# Patient Record
Sex: MALE | Race: WHITE | NOT HISPANIC OR LATINO | URBAN - METROPOLITAN AREA
[De-identification: names, ages, dates, MRNs, and addresses within clinical notes are randomized per-mention and may not be internally consistent; named-entity substitution may affect disease eponyms.]

---

## 2017-01-11 ENCOUNTER — OUTPATIENT (OUTPATIENT)
Dept: OUTPATIENT SERVICES | Facility: HOSPITAL | Age: 57
LOS: 1 days | Discharge: HOME | End: 2017-01-11

## 2017-06-27 DIAGNOSIS — F41.0 PANIC DISORDER [EPISODIC PAROXYSMAL ANXIETY]: ICD-10-CM

## 2017-06-27 DIAGNOSIS — R94.5 ABNORMAL RESULTS OF LIVER FUNCTION STUDIES: ICD-10-CM

## 2017-06-27 DIAGNOSIS — R73.09 OTHER ABNORMAL GLUCOSE: ICD-10-CM

## 2017-06-27 DIAGNOSIS — H53.8 OTHER VISUAL DISTURBANCES: ICD-10-CM

## 2017-06-27 DIAGNOSIS — K21.9 GASTRO-ESOPHAGEAL REFLUX DISEASE WITHOUT ESOPHAGITIS: ICD-10-CM

## 2017-06-27 DIAGNOSIS — E78.00 PURE HYPERCHOLESTEROLEMIA, UNSPECIFIED: ICD-10-CM

## 2017-06-27 DIAGNOSIS — F17.200 NICOTINE DEPENDENCE, UNSPECIFIED, UNCOMPLICATED: ICD-10-CM

## 2017-06-27 DIAGNOSIS — Z00.00 ENCOUNTER FOR GENERAL ADULT MEDICAL EXAMINATION WITHOUT ABNORMAL FINDINGS: ICD-10-CM

## 2017-09-20 ENCOUNTER — OUTPATIENT (OUTPATIENT)
Dept: OUTPATIENT SERVICES | Facility: HOSPITAL | Age: 57
LOS: 1 days | Discharge: HOME | End: 2017-09-20

## 2017-09-20 DIAGNOSIS — R94.5 ABNORMAL RESULTS OF LIVER FUNCTION STUDIES: ICD-10-CM

## 2017-09-20 DIAGNOSIS — H53.8 OTHER VISUAL DISTURBANCES: ICD-10-CM

## 2017-09-20 DIAGNOSIS — K21.9 GASTRO-ESOPHAGEAL REFLUX DISEASE WITHOUT ESOPHAGITIS: ICD-10-CM

## 2017-09-20 DIAGNOSIS — E78.00 PURE HYPERCHOLESTEROLEMIA, UNSPECIFIED: ICD-10-CM

## 2017-09-20 DIAGNOSIS — F41.0 PANIC DISORDER [EPISODIC PAROXYSMAL ANXIETY]: ICD-10-CM

## 2017-09-20 DIAGNOSIS — R73.09 OTHER ABNORMAL GLUCOSE: ICD-10-CM

## 2017-09-20 DIAGNOSIS — J30.2 OTHER SEASONAL ALLERGIC RHINITIS: ICD-10-CM

## 2017-09-20 DIAGNOSIS — F17.200 NICOTINE DEPENDENCE, UNSPECIFIED, UNCOMPLICATED: ICD-10-CM

## 2017-09-20 DIAGNOSIS — M54.9 DORSALGIA, UNSPECIFIED: ICD-10-CM

## 2017-12-04 ENCOUNTER — OUTPATIENT (OUTPATIENT)
Dept: OUTPATIENT SERVICES | Facility: HOSPITAL | Age: 57
LOS: 1 days | Discharge: HOME | End: 2017-12-04

## 2017-12-04 DIAGNOSIS — E78.01 FAMILIAL HYPERCHOLESTEROLEMIA: ICD-10-CM

## 2018-01-26 ENCOUNTER — TRANSCRIPTION ENCOUNTER (OUTPATIENT)
Age: 58
End: 2018-01-26

## 2018-05-24 ENCOUNTER — OUTPATIENT (OUTPATIENT)
Dept: OUTPATIENT SERVICES | Facility: HOSPITAL | Age: 58
LOS: 1 days | Discharge: HOME | End: 2018-05-24

## 2018-05-24 DIAGNOSIS — F41.0 PANIC DISORDER [EPISODIC PAROXYSMAL ANXIETY]: ICD-10-CM

## 2018-05-24 DIAGNOSIS — F17.200 NICOTINE DEPENDENCE, UNSPECIFIED, UNCOMPLICATED: ICD-10-CM

## 2018-05-24 DIAGNOSIS — R94.5 ABNORMAL RESULTS OF LIVER FUNCTION STUDIES: ICD-10-CM

## 2018-05-24 DIAGNOSIS — H53.8 OTHER VISUAL DISTURBANCES: ICD-10-CM

## 2018-05-24 DIAGNOSIS — R73.09 OTHER ABNORMAL GLUCOSE: ICD-10-CM

## 2018-05-24 DIAGNOSIS — J30.2 OTHER SEASONAL ALLERGIC RHINITIS: ICD-10-CM

## 2018-05-24 DIAGNOSIS — E78.00 PURE HYPERCHOLESTEROLEMIA, UNSPECIFIED: ICD-10-CM

## 2018-05-24 DIAGNOSIS — M54.9 DORSALGIA, UNSPECIFIED: ICD-10-CM

## 2018-05-24 DIAGNOSIS — K21.9 GASTRO-ESOPHAGEAL REFLUX DISEASE WITHOUT ESOPHAGITIS: ICD-10-CM

## 2018-08-01 ENCOUNTER — OUTPATIENT (OUTPATIENT)
Dept: OUTPATIENT SERVICES | Facility: HOSPITAL | Age: 58
LOS: 1 days | Discharge: HOME | End: 2018-08-01

## 2018-08-01 DIAGNOSIS — B58.81 TOXOPLASMA MYOCARDITIS: ICD-10-CM

## 2018-08-01 DIAGNOSIS — I25.10 ATHEROSCLEROTIC HEART DISEASE OF NATIVE CORONARY ARTERY WITHOUT ANGINA PECTORIS: ICD-10-CM

## 2018-08-01 DIAGNOSIS — R78.89 FINDING OF OTHER SPECIFIED SUBSTANCES, NOT NORMALLY FOUND IN BLOOD: ICD-10-CM

## 2018-11-02 ENCOUNTER — OUTPATIENT (OUTPATIENT)
Dept: OUTPATIENT SERVICES | Facility: HOSPITAL | Age: 58
LOS: 1 days | Discharge: HOME | End: 2018-11-02

## 2018-11-02 DIAGNOSIS — R91.8 OTHER NONSPECIFIC ABNORMAL FINDING OF LUNG FIELD: ICD-10-CM

## 2018-11-07 ENCOUNTER — OUTPATIENT (OUTPATIENT)
Dept: OUTPATIENT SERVICES | Facility: HOSPITAL | Age: 58
LOS: 1 days | Discharge: HOME | End: 2018-11-07

## 2018-11-07 DIAGNOSIS — R94.5 ABNORMAL RESULTS OF LIVER FUNCTION STUDIES: ICD-10-CM

## 2018-11-07 DIAGNOSIS — J30.2 OTHER SEASONAL ALLERGIC RHINITIS: ICD-10-CM

## 2018-11-07 DIAGNOSIS — R91.8 OTHER NONSPECIFIC ABNORMAL FINDING OF LUNG FIELD: ICD-10-CM

## 2018-11-07 DIAGNOSIS — M54.9 DORSALGIA, UNSPECIFIED: ICD-10-CM

## 2018-11-07 DIAGNOSIS — H53.8 OTHER VISUAL DISTURBANCES: ICD-10-CM

## 2018-11-07 DIAGNOSIS — E78.00 PURE HYPERCHOLESTEROLEMIA, UNSPECIFIED: ICD-10-CM

## 2018-11-07 DIAGNOSIS — F41.0 PANIC DISORDER [EPISODIC PAROXYSMAL ANXIETY]: ICD-10-CM

## 2018-11-07 DIAGNOSIS — F17.200 NICOTINE DEPENDENCE, UNSPECIFIED, UNCOMPLICATED: ICD-10-CM

## 2018-11-07 DIAGNOSIS — K21.9 GASTRO-ESOPHAGEAL REFLUX DISEASE WITHOUT ESOPHAGITIS: ICD-10-CM

## 2018-11-07 DIAGNOSIS — R73.09 OTHER ABNORMAL GLUCOSE: ICD-10-CM

## 2018-11-27 ENCOUNTER — TRANSCRIPTION ENCOUNTER (OUTPATIENT)
Age: 58
End: 2018-11-27

## 2018-12-05 ENCOUNTER — TRANSCRIPTION ENCOUNTER (OUTPATIENT)
Age: 58
End: 2018-12-05

## 2019-01-29 ENCOUNTER — TRANSCRIPTION ENCOUNTER (OUTPATIENT)
Age: 59
End: 2019-01-29

## 2019-11-01 ENCOUNTER — OUTPATIENT (OUTPATIENT)
Dept: OUTPATIENT SERVICES | Facility: HOSPITAL | Age: 59
LOS: 1 days | Discharge: HOME | End: 2019-11-01

## 2019-11-01 DIAGNOSIS — E78.00 PURE HYPERCHOLESTEROLEMIA, UNSPECIFIED: ICD-10-CM

## 2019-11-01 DIAGNOSIS — J30.2 OTHER SEASONAL ALLERGIC RHINITIS: ICD-10-CM

## 2019-11-01 DIAGNOSIS — Z00.00 ENCOUNTER FOR GENERAL ADULT MEDICAL EXAMINATION WITHOUT ABNORMAL FINDINGS: ICD-10-CM

## 2019-11-01 DIAGNOSIS — M54.9 DORSALGIA, UNSPECIFIED: ICD-10-CM

## 2019-11-01 DIAGNOSIS — F17.200 NICOTINE DEPENDENCE, UNSPECIFIED, UNCOMPLICATED: ICD-10-CM

## 2019-11-01 DIAGNOSIS — F41.0 PANIC DISORDER [EPISODIC PAROXYSMAL ANXIETY]: ICD-10-CM

## 2019-11-01 DIAGNOSIS — H53.8 OTHER VISUAL DISTURBANCES: ICD-10-CM

## 2019-11-01 DIAGNOSIS — R91.8 OTHER NONSPECIFIC ABNORMAL FINDING OF LUNG FIELD: ICD-10-CM

## 2019-11-01 DIAGNOSIS — R73.09 OTHER ABNORMAL GLUCOSE: ICD-10-CM

## 2019-11-01 DIAGNOSIS — K21.9 GASTRO-ESOPHAGEAL REFLUX DISEASE WITHOUT ESOPHAGITIS: ICD-10-CM

## 2019-11-01 DIAGNOSIS — R94.5 ABNORMAL RESULTS OF LIVER FUNCTION STUDIES: ICD-10-CM

## 2020-01-18 ENCOUNTER — OUTPATIENT (OUTPATIENT)
Dept: OUTPATIENT SERVICES | Facility: HOSPITAL | Age: 60
LOS: 1 days | Discharge: HOME | End: 2020-01-18
Payer: COMMERCIAL

## 2020-01-18 DIAGNOSIS — R91.8 OTHER NONSPECIFIC ABNORMAL FINDING OF LUNG FIELD: ICD-10-CM

## 2020-01-18 PROCEDURE — 71250 CT THORAX DX C-: CPT | Mod: 26

## 2021-05-27 ENCOUNTER — TRANSCRIPTION ENCOUNTER (OUTPATIENT)
Age: 61
End: 2021-05-27

## 2021-09-03 ENCOUNTER — TRANSCRIPTION ENCOUNTER (OUTPATIENT)
Age: 61
End: 2021-09-03

## 2021-12-06 PROBLEM — Z00.00 ENCOUNTER FOR PREVENTIVE HEALTH EXAMINATION: Status: ACTIVE | Noted: 2021-12-06

## 2023-03-08 ENCOUNTER — APPOINTMENT (OUTPATIENT)
Dept: PULMONOLOGY | Facility: CLINIC | Age: 63
End: 2023-03-08
Payer: COMMERCIAL

## 2023-03-08 ENCOUNTER — NON-APPOINTMENT (OUTPATIENT)
Age: 63
End: 2023-03-08

## 2023-03-08 VITALS
OXYGEN SATURATION: 98 % | DIASTOLIC BLOOD PRESSURE: 70 MMHG | HEART RATE: 72 BPM | RESPIRATION RATE: 14 BRPM | BODY MASS INDEX: 23.16 KG/M2 | HEIGHT: 72 IN | SYSTOLIC BLOOD PRESSURE: 118 MMHG | WEIGHT: 171 LBS

## 2023-03-08 DIAGNOSIS — K21.9 GASTRO-ESOPHAGEAL REFLUX DISEASE W/OUT ESOPHAGITIS: ICD-10-CM

## 2023-03-08 DIAGNOSIS — J44.9 CHRONIC OBSTRUCTIVE PULMONARY DISEASE, UNSPECIFIED: ICD-10-CM

## 2023-03-08 DIAGNOSIS — Z86.39 PERSONAL HISTORY OF OTHER ENDOCRINE, NUTRITIONAL AND METABOLIC DISEASE: ICD-10-CM

## 2023-03-08 DIAGNOSIS — Z87.891 PERSONAL HISTORY OF NICOTINE DEPENDENCE: ICD-10-CM

## 2023-03-08 DIAGNOSIS — R91.8 OTHER NONSPECIFIC ABNORMAL FINDING OF LUNG FIELD: ICD-10-CM

## 2023-03-08 PROCEDURE — G0296 VISIT TO DETERM LDCT ELIG: CPT

## 2023-03-08 PROCEDURE — 99203 OFFICE O/P NEW LOW 30 MIN: CPT | Mod: 25

## 2023-03-08 NOTE — HISTORY OF PRESENT ILLNESS
[TextBox_4] : 62 years old presented for above last seen in January 2020 at that time he was active smoker and he had lung nodule.  He said he stopped smoking few months after so almost 3 years ago he denies any shortness of breath any wheezing he does not take any inhaler last CAT scan he had an 2021 and the PFT 3 years ago was normal.  He denies any recurrent bronchitis no history of COPD.

## 2023-03-08 NOTE — REASON FOR VISIT
[Initial] : an initial visit [Abnormal CXR/ Chest CT] : an abnormal CXR/ chest CT [COPD] : COPD [Pulmonary Nodules] : pulmonary nodules

## 2023-03-08 NOTE — DISCUSSION/SUMMARY
[FreeTextEntry1] : Lung nodule high risk ex-smoker more than 30 pack a year\par Chest CT screening\par Need to rule out COPD\par PFTs\par To call me after CAT scan\par

## 2023-03-26 ENCOUNTER — RESULT REVIEW (OUTPATIENT)
Age: 63
End: 2023-03-26

## 2023-03-26 ENCOUNTER — OUTPATIENT (OUTPATIENT)
Dept: OUTPATIENT SERVICES | Facility: HOSPITAL | Age: 63
LOS: 1 days | End: 2023-03-26
Payer: COMMERCIAL

## 2023-03-26 DIAGNOSIS — R91.1 SOLITARY PULMONARY NODULE: ICD-10-CM

## 2023-03-26 DIAGNOSIS — Z00.8 ENCOUNTER FOR OTHER GENERAL EXAMINATION: ICD-10-CM

## 2023-03-26 PROCEDURE — 71271 CT THORAX LUNG CANCER SCR C-: CPT

## 2023-03-26 PROCEDURE — 71271 CT THORAX LUNG CANCER SCR C-: CPT | Mod: 26

## 2023-03-27 DIAGNOSIS — R91.1 SOLITARY PULMONARY NODULE: ICD-10-CM

## 2023-03-30 ENCOUNTER — TRANSCRIPTION ENCOUNTER (OUTPATIENT)
Age: 63
End: 2023-03-30

## 2023-10-06 ENCOUNTER — NON-APPOINTMENT (OUTPATIENT)
Age: 63
End: 2023-10-06

## 2023-10-09 ENCOUNTER — APPOINTMENT (OUTPATIENT)
Dept: PLASTIC SURGERY | Facility: CLINIC | Age: 63
End: 2023-10-09
Payer: COMMERCIAL

## 2023-10-09 VITALS — BODY MASS INDEX: 23.3 KG/M2 | HEIGHT: 72 IN | WEIGHT: 172 LBS

## 2023-10-09 DIAGNOSIS — D48.5 NEOPLASM OF UNCERTAIN BEHAVIOR OF SKIN: ICD-10-CM

## 2023-10-09 DIAGNOSIS — Z78.9 OTHER SPECIFIED HEALTH STATUS: ICD-10-CM

## 2023-10-09 PROCEDURE — 99203 OFFICE O/P NEW LOW 30 MIN: CPT | Mod: 25

## 2023-10-09 PROCEDURE — 11103 TANGNTL BX SKIN EA SEP/ADDL: CPT

## 2023-10-09 PROCEDURE — 11102 TANGNTL BX SKIN SINGLE LES: CPT

## 2023-10-09 RX ORDER — PANTOPRAZOLE SODIUM 40 MG/1
40 GRANULE, DELAYED RELEASE ORAL
Refills: 0 | Status: ACTIVE | COMMUNITY

## 2023-10-09 RX ORDER — PAROXETINE HYDROCHLORIDE 40 MG/1
40 TABLET, FILM COATED ORAL
Refills: 0 | Status: ACTIVE | COMMUNITY

## 2023-10-09 RX ORDER — ELECTROLYTES/DEXTROSE
SOLUTION, ORAL ORAL
Refills: 0 | Status: ACTIVE | COMMUNITY

## 2023-10-09 RX ORDER — ASPIRIN 81 MG
81 TABLET, DELAYED RELEASE (ENTERIC COATED) ORAL
Refills: 0 | Status: ACTIVE | COMMUNITY

## 2023-10-09 RX ORDER — ATORVASTATIN CALCIUM 40 MG/1
40 TABLET, FILM COATED ORAL
Refills: 0 | Status: ACTIVE | COMMUNITY

## 2023-10-23 ENCOUNTER — APPOINTMENT (OUTPATIENT)
Dept: PLASTIC SURGERY | Facility: CLINIC | Age: 63
End: 2023-10-23
Payer: COMMERCIAL

## 2023-10-23 DIAGNOSIS — L82.1 OTHER SEBORRHEIC KERATOSIS: ICD-10-CM

## 2023-10-23 PROCEDURE — 99024 POSTOP FOLLOW-UP VISIT: CPT

## 2024-01-22 ENCOUNTER — APPOINTMENT (OUTPATIENT)
Dept: PLASTIC SURGERY | Facility: CLINIC | Age: 64
End: 2024-01-22
Payer: COMMERCIAL

## 2024-01-22 LAB — CORE LAB BIOPSY: NORMAL

## 2024-01-22 PROCEDURE — 11102 TANGNTL BX SKIN SINGLE LES: CPT

## 2024-01-22 PROCEDURE — 11103 TANGNTL BX SKIN EA SEP/ADDL: CPT

## 2024-01-22 NOTE — DATA REVIEWED
[FreeTextEntry1] : Patient:   LOUISE TEMPLE  Accession:                             [id number]  Collected Date/Time:                   10/9/2023 10:45 EDT Received Date/Time:                    10/10/2023 09:51 EDT  Surgical Pathology Report - Auth (Verified)  Specimen(s) Submitted 1  Vertex of scalp skin lesion 2  Right anteroparietal scalp skin lesion 3  Right parietal scalp skin lesion  Final Diagnosis 1.  Vertex of scalp, skin lesion:      -Seborrheic keratosis.   2. Right anteroparietal scalp, skin lesion, shave biopsy:     -Seborrheic keratosis.  3. Right parietal scalp skin lesion:     -Seborrheic keratosis, focally irritated. Verified by: Lynne Opitz, M.D. (Electronic Signature) Reported on: 10/11/23 16:37 EDT, [place] [address] Phone: [phone]   Fax: [phone] _________________________________________________________________   Clinical Information Scalp lesions, shave biopsies  Perioperative Diagnosis D48.5-Neoplasm of uncertain behavior of skin  Gross Description 1.  Received in formalin labeled "vertex of scalp skin lesion".  The  specimen consists of 1 tan skin shave biopsy measuring 1.7 x 1.0 x 0.1  cm.  The specimen is inked, serially sectioned and entirely submitted.  (1 block)  2.  Received in formalin labeled "right anterior parietal scalp skin  lesion".  The specimen consists of 1 tan skin shave biopsy measuring 1.3  x 1.0 x 0.1 cm.  The specimen is inked, serially sectioned and entirely  submitted.  (1 block)  3.  Received in formalin labeled "right parietal scalp skin lesion".  The  specimen consists of 1 tan skin shave biopsy measuring 2.2 x 1.0 x 0.1  cm.  The specimen is inked, serially sectioned and entirely submitted.  (2 blocks)   10/11/2023 05:20:09 EDT lb  Disclaimer In addition to other data that may appear on the specimen containers, all  labels have been inspected to confirm the presence of the patients name  and date of birth.  Specimen was received and underwent gross examination at [facility], [address] [place] [address].  Ordered by: JEANNETTE BASS       Collected/Examined: 09Oct2023 10:45AM       Verification Required       Stage: Final       Performed at: [facility] (Med Director: King Guzmán)       Resulted: 11Oct2023 04:37PM       Last Updated: 11Oct2023 04:38PM       Accession: [id number]       Results Hx:	 There are no additional results to show Details:	 To Be Done:	09Oct2023 Status:	Resulted: Requires Verification For:	Neoplasm of uncertain behavior of scalp (D48.5) Overdue:	09Jan2024 10:42AM To Be Performed:	[facility] Communicated By:	Requested transmission to performing location Priority:	Routine Ordered By:	JEANNETTE BASS Supervised By:	JEANNETTE BASS Managed By:	JEANNETTE BASS Authorization:	Not Required Performing Instructions:	 Patient Instructions:	 Order Instructions:	 Questions:	 Date/Time Collected A: 09Oct2023 Number of Sites (Enter each site description below.) A: 3 Site of Specimen A: Vertex of scalp skin lesion shave biopsy Site of Specimen A: Right anteroparietal scalp skin lesion shave biopsy Site of Specimen A: Right parietal scalp skin lesion shave biopsy Add'l Details:	 Financial Auth:	Not Needed Authorization #:	 Appt. Status:	Appointment Not Needed Effective:	09Oct2023 12:00AM Expires:	09Oct2024 12:00AM Done:	09Oct2023 10:45AM Order #:	[id number] Requisition #:	[id number] Label Type:	 Collection:	Collection Specimen Identifier:	 ID:	 CPT:	 LOINC:	 SNOMED:	 Type:	 Charges:	None Will Be Collected in Office?	No View link item history	 Goals:	None Charging:	          (none) Encounters:	 Creation:	09Oct2023 Appointment JEANNETTE BASS (Plastic Surgery)  Collection:	None Specified Be Done By:	None Specified Scheduled:	None Specified Performed:	None Specified Charge :	None Specified Annotations:	          (none) Electronically Signed By: JEANNETTE BASS MD 09-Oct-2023 10:43 AM

## 2024-01-22 NOTE — ASSESSMENT
[FreeTextEntry1] : 62 y/o male with h/o GERD and HLD who with h/o scalp AK s/p removal of 3x scalp seborrheic keratosis. doing very well (10/9/23) s/p shave biopsy remaining right parietal keratoses  ?left forearm subcutaneous ?ganglion cyst  - Baci/ Aquaphor to all scalp sites - Daily SPF - signs and symptoms of infection reviewed - continue routine derm f/us  -recommend observation left forearm cyst, currently non palpable - All post op instructions reviewed, all questions answered - f/u in 2 weeks with PA path check and scalp surgical site scar mgmt

## 2024-01-22 NOTE — HISTORY OF PRESENT ILLNESS
[FreeTextEntry1] : 62 y/o male with h/o GERD and HLD who presents for evaluation of scalp lesions. Pt states that he has had scalp SKs treated for many years by , however they continue to recur and he is looking for a more permenant option. Denies any h/o skin cancer.   Former smoker, quite 3 years ago (previous >30 years) On ASA prophylactically for high calcium levels  Occ: retired   Interval hx (10/23/23): Pt is now 2 weeks s/p removal of 3x scalp lesions (All Seborrheic kertosis per path). Pt denies fever/chills/drainage. Pt also with c/o right forearm cyst which he has had for years, however it becomes intermittently larger and painful if he accidently bumps it.   Interval hx (1/22/24): here for shave biopsy scalp skin lesions.  Reports left forearm cyst no longer present.  Will forego forearm cyst excision and proceed with scalp skin lesion shave biopsies

## 2024-01-22 NOTE — PHYSICAL EXAM
[de-identified] : Well developed, well nourished in NAD  [de-identified] : Atraumatic, normocephalic, +malepattern baldnessa. R parietal scalp shave sites are CDI and scabbed over without any open areas/active drainage. no cellulitis, erythema or signs of infection. With resudual AK's R adjacent to most posterior shave site, along with additional AKs in R  temporal hairline [de-identified] : EOMIs, +glasses [de-identified] : Non labored on RA [de-identified] : Right anterior forearm with no palpable mass. No overlying skin changes. Currently non tender.

## 2024-01-22 NOTE — PROCEDURE
[Nl] : None [FreeTextEntry1] : parietal scalp AK and right temple (parietal) SK [FreeTextEntry2] : shave biopsy right parietal scalp AK and right temple (parietal) SK [FreeTextEntry6] : Benefits, risks and alternatives of the procedure were discussed. The risks include but not limited to bleeding, infection, poor wound healing and scarring, possible keloid, and need for re-operation. Location of scar and expected post-surgical outcomes were discussed. The patient understands the risks and would like to proceed with the office surgery.  The skin lesion was marked and infiltrated with local anesthesia to effect.  The excision site was prepared and draped in sterile fashion. The skin lesion was excised with the indicated margins in the usual fashion and sent to pathology for review.  Surgical wounds were made hemostatic and repaired as follows:  Site: right parietal scalp Skin lesion width (with margins):  3.5 x 2.5 cm Closure complexity and length: shave biopsy, Monsel's topical, bacitracin, bandage  Site: right temple scalp Skin lesion width (with margins): 1 cm  Closure complexity and length: shave biopsy, Monsel's topical, bacitracin, bandage [FreeTextEntry7] : parietal scalp AK and right temple (parietal) SK

## 2024-01-24 ENCOUNTER — APPOINTMENT (OUTPATIENT)
Dept: ORTHOPEDIC SURGERY | Facility: CLINIC | Age: 64
End: 2024-01-24
Payer: COMMERCIAL

## 2024-01-24 VITALS — HEIGHT: 73 IN | BODY MASS INDEX: 21.87 KG/M2 | WEIGHT: 165 LBS

## 2024-01-24 DIAGNOSIS — M18.12 UNILATERAL PRIMARY OSTEOARTHRITIS OF FIRST CARPOMETACARPAL JOINT, LEFT HAND: ICD-10-CM

## 2024-01-24 PROCEDURE — 73140 X-RAY EXAM OF FINGER(S): CPT | Mod: LT

## 2024-01-24 PROCEDURE — 99214 OFFICE O/P EST MOD 30 MIN: CPT | Mod: 25

## 2024-01-24 PROCEDURE — 20550 NJX 1 TENDON SHEATH/LIGAMENT: CPT

## 2024-01-24 NOTE — DATA REVIEWED
[FreeTextEntry1] :  3 x-ray views taken in the office today of his left thumb show basal joint arthritis without any acute displaced fractures or bony abnormalities.

## 2024-01-24 NOTE — DISCUSSION/SUMMARY
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  We discussed treatment options at length for both of them.  For the arthritis, I recommend a thumb guard for immobilization and support.  He may take over-the-counter anti-inflammatories at his discretion, but he prefers not to take a prescription.  We discussed the option of a cortisone injection in the future, but we will hold off on this for now since the pain has only increased in the last 2 weeks.  Then we discussed treatment options for the trigger finger including observation, cortisone injection, and trigger finger release. The risks and benefits of a cortisone injection were explained to the patient. He understands that sometimes there is a need for second injection.  If the second injection fails, he may want to consider a trigger finger release. The patient would like to proceed with the injection. Under sterile technique and with the patient's consent, I injected 1 cc of dexamethasone and 1 cc of 1% lidocaine into the A1 pulley of the left thumb. He tolerated the procedure well. The puncture site was covered with a band aid. He understands that triggering can reoccur after the cortisone injection.  If it reoccurs, we may consider a 2nd cortisone injection. If it reoccurs a second time, the patient may want to consider a trigger finger release. Follow up in 4-6 weeks with Dr. Vann for repeat evaluation and possibly a second injection and possible surgery consult.

## 2024-01-24 NOTE — PHYSICAL EXAM
[de-identified] : Physical exam of his left wrist and thumb: He has some mild swelling of the digit.  Negative ecchymosis.  Nontender over the distal radius or distal ulna.  Negative anatomical snuffbox tenderness.  Nontender over the radial styloid or first dorsal compartment.  He has pain over the basal joint.  He has pain of the A1 pulley.  There is active locking and clicking of the digit.  Negative Finkelstein.  He can make a fist.  His sensory and motor are intact.

## 2024-01-24 NOTE — HISTORY OF PRESENT ILLNESS
[de-identified] : Patient is a 63-year-old male here for evaluation of his left thumb.  He states that he has been having pain for the last few weeks, however he had experienced a similar pain back in the 90s and was diagnosed with DeQuervein's tenosynovitis.  He had an injection which lasted him a very long time.  He saw Dr. Raza for knee appointment he mentioned his hand.  He recommended that he goes to therapy.  He did 1 session but states the pain felt worse afterwards.

## 2024-01-25 LAB — CORE LAB BIOPSY: NORMAL

## 2024-02-06 ENCOUNTER — APPOINTMENT (OUTPATIENT)
Dept: PLASTIC SURGERY | Facility: CLINIC | Age: 64
End: 2024-02-06

## 2024-02-23 ENCOUNTER — APPOINTMENT (OUTPATIENT)
Dept: ORTHOPEDIC SURGERY | Facility: CLINIC | Age: 64
End: 2024-02-23
Payer: COMMERCIAL

## 2024-02-23 VITALS — HEIGHT: 73 IN | WEIGHT: 165 LBS | BODY MASS INDEX: 21.87 KG/M2

## 2024-02-23 DIAGNOSIS — M65.312 TRIGGER THUMB, LEFT THUMB: ICD-10-CM

## 2024-02-23 PROCEDURE — 99212 OFFICE O/P EST SF 10 MIN: CPT | Mod: 25

## 2024-02-23 PROCEDURE — 20550 NJX 1 TENDON SHEATH/LIGAMENT: CPT | Mod: FA

## 2024-02-23 PROCEDURE — 99202 OFFICE O/P NEW SF 15 MIN: CPT | Mod: 25

## 2024-02-23 NOTE — PHYSICAL EXAM
[de-identified] : Patient is tender to palpation A1 pulley left thumb.  There is triggering present.  There is normal sensation normal cap refill.  No erythema ecchymoses or abrasions.

## 2024-02-23 NOTE — ASSESSMENT
[FreeTextEntry1] : Patient has a left-sided trigger thumb.  Patient will receive his second cortisone injection into the area of the A1 pulley today.  He tolerated it well.  Will see how the injection does.  Hopefully will help to relieve his symptoms.  If it does not the potential for surgical intervention for trigger thumb release was discussed with the patient.

## 2024-02-23 NOTE — PROCEDURE
[FreeTextEntry3] : Trigger finger injection was performed because of pain inflammation and stiffness Anesthesia: ethyl chloride sprayed topically Dexamethasone injection of 1cc 4mg/ml Lidocaine: An injection of Lidocaine 1% 1cc  Patient has tried OTC's including aspirin, Ibuprofen, Aleve etc or prescription NSAIDS, and/or exercises at home and/ or physical therapy without satisfactory response. After verbal consent using sterile preparation and technique. The risks, benefits, and alternatives to cortisone injection were explained in full to the patient. Risks outlined include but are not limited to infection, sepsis, bleeding, scarring, skin discoloration, temporary increase in pain, syncopal episode, failure to resolve symptoms, allergic reaction, symptom recurrence, and elevation of blood sugar in diabetics. Patient understood the risks. All questions were answered. After discussion of options, patient requested an injection. Oral informed consent was obtained and sterile prep was done of the injection site. Sterile technique was utilized for the procedure including the preparation of the solutions used for the injection. Patient tolerated the procedure well. Advised to ice the injection site this evening. Prep with ETOH  locally to site. Sterile technique used.  tendon sheath injected Left thumb flexor tendon sheath

## 2024-02-23 NOTE — HISTORY OF PRESENT ILLNESS
[de-identified] : 63-year-old male comes in with a left-sided trigger thumb.  Patient had injection at his last visit.  It helped but not completely to relieve the triggering of his left thumb.  And he comes in today for evaluation.

## 2025-04-16 ENCOUNTER — APPOINTMENT (OUTPATIENT)
Dept: PULMONOLOGY | Facility: CLINIC | Age: 65
End: 2025-04-16
Payer: COMMERCIAL

## 2025-04-16 VITALS
HEART RATE: 76 BPM | SYSTOLIC BLOOD PRESSURE: 122 MMHG | HEIGHT: 73 IN | BODY MASS INDEX: 22.93 KG/M2 | DIASTOLIC BLOOD PRESSURE: 70 MMHG | RESPIRATION RATE: 15 BRPM | WEIGHT: 173 LBS | OXYGEN SATURATION: 98 %

## 2025-04-16 DIAGNOSIS — J44.9 CHRONIC OBSTRUCTIVE PULMONARY DISEASE, UNSPECIFIED: ICD-10-CM

## 2025-04-16 DIAGNOSIS — R91.8 OTHER NONSPECIFIC ABNORMAL FINDING OF LUNG FIELD: ICD-10-CM

## 2025-04-16 PROCEDURE — 99213 OFFICE O/P EST LOW 20 MIN: CPT | Mod: 25

## 2025-04-16 PROCEDURE — G0296 VISIT TO DETERM LDCT ELIG: CPT

## 2025-04-16 PROCEDURE — G2211 COMPLEX E/M VISIT ADD ON: CPT | Mod: NC

## 2025-04-18 ENCOUNTER — APPOINTMENT (OUTPATIENT)
Dept: CARDIOTHORACIC SURGERY | Facility: CLINIC | Age: 65
End: 2025-04-18
Payer: COMMERCIAL

## 2025-04-18 VITALS — BODY MASS INDEX: 22.93 KG/M2 | HEIGHT: 73 IN | WEIGHT: 173 LBS

## 2025-04-18 DIAGNOSIS — Z87.891 PERSONAL HISTORY OF NICOTINE DEPENDENCE: ICD-10-CM

## 2025-04-18 PROCEDURE — ACP01: CPT | Mod: NC

## 2025-05-30 ENCOUNTER — OUTPATIENT (OUTPATIENT)
Dept: OUTPATIENT SERVICES | Facility: HOSPITAL | Age: 65
LOS: 1 days | End: 2025-05-30
Payer: COMMERCIAL

## 2025-05-30 ENCOUNTER — RESULT REVIEW (OUTPATIENT)
Age: 65
End: 2025-05-30

## 2025-05-30 DIAGNOSIS — J44.9 CHRONIC OBSTRUCTIVE PULMONARY DISEASE, UNSPECIFIED: ICD-10-CM

## 2025-05-30 PROCEDURE — 71250 CT THORAX DX C-: CPT | Mod: 26

## 2025-05-30 PROCEDURE — 71250 CT THORAX DX C-: CPT

## 2025-05-31 DIAGNOSIS — J44.9 CHRONIC OBSTRUCTIVE PULMONARY DISEASE, UNSPECIFIED: ICD-10-CM

## 2025-06-03 ENCOUNTER — NON-APPOINTMENT (OUTPATIENT)
Age: 65
End: 2025-06-03

## 2025-07-18 ENCOUNTER — NON-APPOINTMENT (OUTPATIENT)
Age: 65
End: 2025-07-18